# Patient Record
Sex: MALE | Race: OTHER | Employment: OTHER | URBAN - METROPOLITAN AREA
[De-identification: names, ages, dates, MRNs, and addresses within clinical notes are randomized per-mention and may not be internally consistent; named-entity substitution may affect disease eponyms.]

---

## 2020-09-19 ENCOUNTER — HOSPITAL ENCOUNTER (OUTPATIENT)
Age: 35
Discharge: HOME | End: 2020-09-19
Payer: COMMERCIAL

## 2020-09-19 VITALS
BODY MASS INDEX: 23.54 KG/M2 | HEIGHT: 67 IN | HEART RATE: 94 BPM | WEIGHT: 150 LBS | SYSTOLIC BLOOD PRESSURE: 131 MMHG | DIASTOLIC BLOOD PRESSURE: 71 MMHG | TEMPERATURE: 98 F | RESPIRATION RATE: 20 BRPM | OXYGEN SATURATION: 98 %

## 2020-09-19 DIAGNOSIS — T15.92XA FOREIGN BODY OF LEFT EYE, INITIAL ENCOUNTER: Primary | ICD-10-CM

## 2020-09-19 PROCEDURE — 90471 IMMUNIZATION ADMIN: CPT

## 2020-09-19 PROCEDURE — 99204 OFFICE O/P NEW MOD 45 MIN: CPT

## 2020-09-19 PROCEDURE — 99203 OFFICE O/P NEW LOW 30 MIN: CPT

## 2020-09-19 RX ORDER — TETRACAINE HYDROCHLORIDE 5 MG/ML
1 SOLUTION OPHTHALMIC ONCE
Status: COMPLETED | OUTPATIENT
Start: 2020-09-19 | End: 2020-09-19

## 2020-09-19 RX ORDER — CIPROFLOXACIN HYDROCHLORIDE 3.5 MG/ML
2 SOLUTION/ DROPS TOPICAL
Qty: 1 BOTTLE | Refills: 0 | Status: SHIPPED | OUTPATIENT
Start: 2020-09-19 | End: 2020-09-29

## 2020-09-19 NOTE — ED INITIAL ASSESSMENT (HPI)
Patient presents with ml of possible foreign body in left eye x 2 days. 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 States black spot noted.

## 2020-09-19 NOTE — ED PROVIDER NOTES
Patient Seen in: [address] [facility]      History   Patient presents with:  Foreign Body in Eye    Stated Complaint: Foreign Body L Eye    HPI    CHIEF COMPLAINT: Foreign body in the left eye     HISTORY OF PRESENT ILLNESS: Patient is a Smokeless tobacco: Never Used    Alcohol use: Yes    Drug use: Never             Review of Systems    Positive for stated complaint: Foreign Body L Eye  Other systems are as noted in HPI. Constitutional and vital signs reviewed.       All other systems This 60-year-old male presents for evaluation of a foreign body in the left eye. He was able to remove a small portion of the foreign body, but the bulk of it remains. At rest during is noted on fluorescein staining.   For now we will treat with Ciloxan o